# Patient Record
Sex: FEMALE | Race: WHITE
[De-identification: names, ages, dates, MRNs, and addresses within clinical notes are randomized per-mention and may not be internally consistent; named-entity substitution may affect disease eponyms.]

---

## 2017-05-16 ENCOUNTER — HOSPITAL ENCOUNTER (EMERGENCY)
Dept: HOSPITAL 25 - UCEAST | Age: 37
Discharge: HOME | End: 2017-05-16
Payer: COMMERCIAL

## 2017-05-16 VITALS — SYSTOLIC BLOOD PRESSURE: 146 MMHG | DIASTOLIC BLOOD PRESSURE: 99 MMHG

## 2017-05-16 DIAGNOSIS — J45.901: Primary | ICD-10-CM

## 2017-05-16 DIAGNOSIS — I10: ICD-10-CM

## 2017-05-16 PROCEDURE — G0463 HOSPITAL OUTPT CLINIC VISIT: HCPCS

## 2017-05-16 PROCEDURE — 87651 STREP A DNA AMP PROBE: CPT

## 2017-05-16 PROCEDURE — 99212 OFFICE O/P EST SF 10 MIN: CPT

## 2017-05-16 NOTE — UC
Respiratory Complaint HPI





- HPI Summary


HPI Summary: 





very harsh cough---throat hurts she is coughing so hard, no fevers





- History of Current Complaint


Chief Complaint: UCRespiratory


Stated Complaint: COUGH, AND CHEST CONGESTION


Time Seen by Provider: 05/16/17 18:47


Hx Obtained From: Patient


Hx Last Menstrual Period: 5/1/17


Pregnant?: No


Onset/Duration: Sudden Onset, Lasting Days, Still Present, Worse Since - today


Timing: Constant


Severity Initially: Mild


Severity Currently: Moderate


Character: Cough: Nonproductive


Aggravating Factors: Nothing


Alleviating Factors: Nothing


Associated Signs And Symptoms: Positive: Pleuritic Chest Pain, URI, Nasal 

Congestion





- Allergies/Home Medications


Allergies/Adverse Reactions: 


 Allergies











Allergy/AdvReac Type Severity Reaction Status Date / Time


 


Environmental Allergies Allergy  Congestion Uncoded 05/16/17 18:49














PMH/Surg Hx/FS Hx/Imm Hx


Previously Healthy: No


Endocrine History Of: 


   Denies: Diabetes, Thyroid Disease


Cardiovascular History Of: 


   Denies: Cardiac Disorders, Hypertension


Respiratory History Of: Reports: Asthma - seasonal, Bronchitis - HISTORY OF


   Denies: COPD


GI/ History Of: 


   Denies: Ulcer





- Surgical History


Surgical History: Yes


Surgery Procedure, Year, and Place: CHOLECYSTECTOMY 4/2016





- Family History


Known Family History: Positive: Cardiac Disease, Hypertension, Diabetes


Family History: CAD/MI: Grandmother.  Diabetes: Mother, Father.  HTN: Mother, 

Father





- Social History


Occupation: Employed Full-time - 


Lives: With Family


Alcohol Use: None


Substance Use Type: None


Smoking Status (MU): Never Smoked Tobacco





Review of Systems


Constitutional: Negative


Skin: Negative


Eyes: Negative


ENT: Sore Throat


Respiratory: Cough


Cardiovascular: Negative


Gastrointestinal: Negative


Genitourinary: Negative


Motor: Negative


Neurovascular: Negative


Musculoskeletal: Negative


Neurological: Negative


Psychological: Negative


All Other Systems Reviewed And Are Negative: Yes





Physical Exam


Triage Information Reviewed: Yes


Appearance: No Pain Distress, Well-Nourished, Ill-Appearing


Vital Signs: 


 Initial Vital Signs











Temp  98.3 F   05/16/17 18:46


 


Pulse  102   05/16/17 18:46


 


Resp  16   05/16/17 18:46


 


BP  146/99   05/16/17 18:46


 


Pulse Ox  97   05/16/17 18:46











Vital Signs Reviewed: Yes


Eye Exam: Normal


Eyes: Positive: Conjunctiva Clear


ENT Exam: Normal


ENT: Positive: Normal ENT inspection, Hearing grossly normal, Pharynx normal, 

TMs normal.  Negative: Nasal congestion, Nasal drainage, Tonsillar swelling, 

Tonsillar exudate, Trismus, Muffled/hoarse voice


Dental Exam: Normal


Neck exam: Normal


Neck: Positive: Supple, Nontender, No Lymphadenopathy


Respiratory Exam: Normal


Respiratory: Positive: Chest non-tender, No respiratory distress, No accessory 

muscle use, Wheezing, Expiration, Inspiration


Cardiovascular Exam: Normal


Cardiovascular: Positive: RRR, No Murmur, Pulses Normal, Brisk Capillary Refill


Musculoskeletal Exam: Normal


Musculoskeletal: Positive: Strength Intact, ROM Intact, No Edema


Neurological Exam: Normal


Neurological: Positive: Alert, Muscle Tone Normal


Psychological Exam: Normal


Skin Exam: Normal





UC Diagnostic Evaluation





- Laboratory


O2 Sat by Pulse Oximetry: 97





Re-Evaluation





- Re-Evaluation


  ** First Eval


Change: Improved - lungs CTA after nebulizer--pt feels much better





Respiratory Course/Dx





- Course


Course Of Treatment: Continue using albuterol, prednisone, rest increase fluids

, and antibiodic should symptoms worsen or fail to improve follow with pcp to re

-check Blood pressre and bronchospasm





- Differential Dx/Diagnosis


Differential Diagnosis/HQI/PQRI: Asthma, Bronchitis, Laryngitis


Provider Diagnoses: Acute exacerbation of asthma, hypertension with out current 

diagnosis





Discharge





- Discharge Plan


Condition: Stable


Disposition: HOME


Prescriptions: 


Azithromycin TAB* [Zithromax TAB (Z-NORMA) 250 mg #6 tabs] 2 tab PO .TODAY, THEN 

1 DAILY #1 norma


predniSONE TAB* [Deltasone TAB*] 10 mg PO DAILY #20 tab


Patient Education Materials:  How to Use a Metered-Dose Inhaler (ED), DASH 

Eating Plan (ED), Hypertension (ED), Bronchospasm (ED)


Forms:  *Work Release


Referrals: 


Jed Chavez MD [Primary Care Provider] - 2 Weeks

## 2018-11-05 ENCOUNTER — HOSPITAL ENCOUNTER (EMERGENCY)
Dept: HOSPITAL 25 - UCEAST | Age: 38
Discharge: HOME | End: 2018-11-05
Payer: COMMERCIAL

## 2018-11-05 VITALS — DIASTOLIC BLOOD PRESSURE: 106 MMHG | SYSTOLIC BLOOD PRESSURE: 158 MMHG
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
## 2018-11-05 DIAGNOSIS — S70.12XA: Primary | ICD-10-CM
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
## 2018-11-05 PROCEDURE — G0463 HOSPITAL OUTPT CLINIC VISIT: HCPCS

## 2018-11-05 PROCEDURE — 99211 OFF/OP EST MAY X REQ PHY/QHP: CPT
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
